# Patient Record
Sex: MALE | HISPANIC OR LATINO | Employment: UNEMPLOYED | URBAN - METROPOLITAN AREA
[De-identification: names, ages, dates, MRNs, and addresses within clinical notes are randomized per-mention and may not be internally consistent; named-entity substitution may affect disease eponyms.]

---

## 2020-03-05 ENCOUNTER — HOSPITAL ENCOUNTER (EMERGENCY)
Facility: HOSPITAL | Age: 20
Discharge: HOME | End: 2020-03-06
Attending: EMERGENCY MEDICINE | Admitting: EMERGENCY MEDICINE

## 2020-03-05 DIAGNOSIS — R06.02 SHORTNESS OF BREATH: Primary | ICD-10-CM

## 2020-03-05 PROCEDURE — 99283 EMERGENCY DEPT VISIT LOW MDM: CPT

## 2020-03-06 ENCOUNTER — APPOINTMENT (OUTPATIENT)
Dept: GENERAL RADIOLOGY | Facility: HOSPITAL | Age: 20
End: 2020-03-06

## 2020-03-06 VITALS
DIASTOLIC BLOOD PRESSURE: 70 MMHG | SYSTOLIC BLOOD PRESSURE: 110 MMHG | RESPIRATION RATE: 16 BRPM | WEIGHT: 167 LBS | HEIGHT: 65 IN | OXYGEN SATURATION: 98 % | TEMPERATURE: 97.2 F | BODY MASS INDEX: 27.82 KG/M2 | HEART RATE: 67 BPM

## 2020-03-06 PROCEDURE — 71046 X-RAY EXAM CHEST 2 VIEWS: CPT
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
## 2020-03-06 NOTE — ED PROVIDER NOTES
EMERGENCY DEPARTMENT ENCOUNTER    Room Number:  31/31  Date seen:  3/6/2020  Time seen: 12:01 AM  PCP: Provider, No Known  Historian: patient      HPI:  Chief Complaint: shortness of air  A complete HPI/ROS/PMH/PSH/SH/FH are unobtainable due to: language barrier (interpretor [id number] used)  Context: Jeff Larsen is a 19 y.o. male who presents to the ED c/o an episode of moderate SOA that began about 30 minutes PTA and improved before his arrival to the ED. The SOA resolved on it's own. There were no aggravating or alleviating factors while the SOA was present. Denies CP. The pt also notes chills and subjective fever during the episode. Denies current abd pain. Denies cough. No recent foreign travel. There are no other complaints.     PAST MEDICAL HISTORY  Active Ambulatory Problems     Diagnosis Date Noted   • No Active Ambulatory Problems     Resolved Ambulatory Problems     Diagnosis Date Noted   • No Resolved Ambulatory Problems     No Additional Past Medical History         PAST SURGICAL HISTORY  History reviewed. No pertinent surgical history.      FAMILY HISTORY  History reviewed. No pertinent family history.      SOCIAL HISTORY  Social History     Socioeconomic History   • Marital status: Single     Spouse name: Not on file   • Number of children: Not on file   • Years of education: Not on file   • Highest education level: Not on file   Tobacco Use   • Smoking status: Never Smoker   • Smokeless tobacco: Never Used   Substance and Sexual Activity   • Alcohol use: Never     Frequency: Never   • Drug use: Never   • Sexual activity: Defer         ALLERGIES  Patient has no known allergies.      REVIEW OF SYSTEMS  Review of Systems   Constitutional: Positive for chills and fever (subjective). Negative for diaphoresis.   HENT: Negative for congestion.    Eyes: Negative for visual disturbance.   Respiratory: Positive for shortness of breath.    Cardiovascular: Negative for palpitations.   Gastrointestinal: Negative for  blood in stool and vomiting.   Endocrine: Negative for polyuria.   Genitourinary: Negative for flank pain.   Musculoskeletal: Negative for joint swelling.   Skin: Negative for wound.   Neurological: Negative for seizures.   Hematological: Negative for adenopathy.   Psychiatric/Behavioral: Negative for sleep disturbance.        All systems reviewed and negative except for those discussed in HPI.     PHYSICAL EXAM  ED Triage Vitals   Temp Heart Rate Resp BP SpO2   03/05/20 2303 03/05/20 2303 03/05/20 2303 03/05/20 2320 03/05/20 2303   97.2 °F (36.2 °C) 73 16 130/81 98 %      Temp src Heart Rate Source Patient Position BP Location FiO2 (%)   03/05/20 2303 03/05/20 2303 03/05/20 2320 03/05/20 2320 --   Tympanic Monitor Lying Right arm          GENERAL: awake, alert, no acute distress  HENT: nares patent, TMs clear bilaterally, oropharynx clear  EYES: no scleral icterus  CV: regular rhythm, normal rate, no murmur  RESPIRATORY: normal effort, CTAB  ABDOMEN: soft, nontender throughout  MUSCULOSKELETAL: no deformity  NEURO: alert, moves all extremities, follows commands  SKIN: warm, dry    Vital signs and nursing notes reviewed.    RADIOLOGY  Xr Chest 2 View    Result Date: 3/6/2020  PA AND LATERAL CHEST RADIOGRAPH  HISTORY: Shortness of air and difficulty breathing  COMPARISON: None available.  FINDINGS: Heart size is within normal limits. Lungs appear clear. No pneumothorax, pleural effusion, or acute infiltrate is seen. Plate-screw fixation of the distal left humerus is seen.      No acute findings.  This report was finalized on 3/6/2020 12:43 AM by Dr. Adriana Beatty M.D.        Ordered the above noted radiological studies. Reviewed by me in PACS.      PROCEDURES  Procedures      MEDICATIONS GIVEN IN ER  Medications - No data to display        PROGRESS AND CONSULTS     0045- Rechecked pt. Pt is resting comfortably.  [id number] used. Notified pt of his negative CXR. Discussed the plan to discharge the pt home. I  instructed the pt to f/u with his PCP. Advised pt to use OTC medications as needed for fever. RTED instructions given. Pt understands and agrees with the plan, all questions answered.    MEDICAL DECISION MAKING    19-year-old male with report of subjective fever and brief period of shortness of breath prior to arrival.  He has a reassuring physical exam with clear breath sounds bilaterally, no wheezing.  His chest x-ray is without evidence of acute process.  He is afebrile here he is appropriate for discharge home with PCP referral and return precautions.    MDM  Number of Diagnoses or Management Options  Shortness of breath:      Amount and/or Complexity of Data Reviewed  Tests in the radiology section of CPT®: ordered and reviewed (CXR- NAD)  Independent visualization of images, tracings, or specimens: yes    Patient Progress  Patient progress: stable           DIAGNOSIS  Final diagnoses:   Shortness of breath         DISPOSITION  DISCHARGE    Patient discharged in stable condition.    Reviewed implications of results, diagnosis, meds, responsibility to follow up, warning signs and symptoms of possible worsening, potential complications and reasons to return to ER.    Patient/Family voiced understanding of above instructions.    Discussed plan for discharge, as there is no emergent indication for admission. Patient referred to primary care provider for BP management due to today's BP. Pt/family is agreeable and understands need for follow up and repeat testing.  Pt is aware that discharge does not mean that nothing is wrong but it indicates no emergency is present that requires admission and they must continue care with follow-up as given below or physician of their choice.     FOLLOW-UP  PATIENT LIAISON [facility] [address]  [phone]  Call in 1 day           Medication List      No changes were made to your prescriptions during this visit.         Latest Documented Vital Signs:  As of 2:50  AM  BP- 110/70 HR- 67 Temp- 97.2 °F (36.2 °C) (Tympanic) O2 sat- 98%      --  Documentation assistance provided by payal Locke for Dr. JAMEE Pichardo MD.  Information recorded by the payal was done at my direction and has been verified and validated by me.      Please note that portions of this were completed with a voice recognition program.        Diallo Locke  03/06/20 0040       Tl Pichardo MD  03/06/20 0251

## 2020-03-06 NOTE — ED TRIAGE NOTES
To ER via PV.  C/o SOA started approx 30 min ago.  Pt feels better at this time.     Pt speaks Malay and will need a .

## 2020-03-06 NOTE — ED NOTES
Pt discharge instructions done via [facility] interpretor ([id number]).       Jack Haddad, KOLTON  03/06/20 0055